# Patient Record
Sex: FEMALE | Race: WHITE | NOT HISPANIC OR LATINO | Employment: FULL TIME | URBAN - METROPOLITAN AREA
[De-identification: names, ages, dates, MRNs, and addresses within clinical notes are randomized per-mention and may not be internally consistent; named-entity substitution may affect disease eponyms.]

---

## 2023-07-22 ENCOUNTER — OFFICE VISIT (OUTPATIENT)
Age: 48
End: 2023-07-22
Payer: COMMERCIAL

## 2023-07-22 VITALS
OXYGEN SATURATION: 98 % | WEIGHT: 150 LBS | HEART RATE: 71 BPM | DIASTOLIC BLOOD PRESSURE: 70 MMHG | HEIGHT: 55 IN | TEMPERATURE: 98.9 F | SYSTOLIC BLOOD PRESSURE: 122 MMHG | RESPIRATION RATE: 18 BRPM | BODY MASS INDEX: 34.71 KG/M2

## 2023-07-22 DIAGNOSIS — N30.00 ACUTE CYSTITIS WITHOUT HEMATURIA: Primary | ICD-10-CM

## 2023-07-22 LAB
SL AMB  POCT GLUCOSE, UA: ABNORMAL
SL AMB LEUKOCYTE ESTERASE,UA: ABNORMAL
SL AMB POCT BILIRUBIN,UA: ABNORMAL
SL AMB POCT BLOOD,UA: ABNORMAL
SL AMB POCT CLARITY,UA: ABNORMAL
SL AMB POCT COLOR,UA: YELLOW
SL AMB POCT KETONES,UA: 15
SL AMB POCT NITRITE,UA: ABNORMAL
SL AMB POCT PH,UA: 6
SL AMB POCT SPECIFIC GRAVITY,UA: 1
SL AMB POCT URINE PROTEIN: ABNORMAL
SL AMB POCT UROBILINOGEN: 0.2

## 2023-07-22 PROCEDURE — 87186 SC STD MICRODIL/AGAR DIL: CPT | Performed by: EMERGENCY MEDICINE

## 2023-07-22 PROCEDURE — 81002 URINALYSIS NONAUTO W/O SCOPE: CPT | Performed by: EMERGENCY MEDICINE

## 2023-07-22 PROCEDURE — 87086 URINE CULTURE/COLONY COUNT: CPT | Performed by: EMERGENCY MEDICINE

## 2023-07-22 PROCEDURE — 87077 CULTURE AEROBIC IDENTIFY: CPT | Performed by: EMERGENCY MEDICINE

## 2023-07-22 PROCEDURE — G0382 LEV 3 HOSP TYPE B ED VISIT: HCPCS | Performed by: EMERGENCY MEDICINE

## 2023-07-22 RX ORDER — NITROFURANTOIN 25; 75 MG/1; MG/1
100 CAPSULE ORAL 2 TIMES DAILY
Qty: 14 CAPSULE | Refills: 0 | Status: SHIPPED | OUTPATIENT
Start: 2023-07-22 | End: 2023-07-29

## 2023-07-22 RX ORDER — DROSPIRENONE AND ETHINYL ESTRADIOL 0.02-3(28)
1 KIT ORAL DAILY
COMMUNITY
Start: 2022-08-08 | End: 2023-08-08

## 2023-07-22 RX ORDER — BUPROPION HYDROCHLORIDE 300 MG/1
300 TABLET ORAL DAILY
COMMUNITY
Start: 2023-07-05

## 2023-07-22 RX ORDER — PHENAZOPYRIDINE HYDROCHLORIDE 200 MG/1
200 TABLET, FILM COATED ORAL
Qty: 10 TABLET | Refills: 0 | Status: SHIPPED | OUTPATIENT
Start: 2023-07-22

## 2023-07-22 RX ORDER — PROGESTERONE 100 MG/1
1 CAPSULE ORAL
COMMUNITY
Start: 2023-05-03

## 2023-07-22 NOTE — PATIENT INSTRUCTIONS
We are sending your urine for culture to determine whether the bacteria causing the infection will be killed with the antibiotic we prescribed for you. We will call you if there is any problem, like resistance, with the antibiotic we have prescribed. You may take over the counter cranberry supplements, such as AZO tablets, which may lessen your UTI symptoms  Increase the amount of fluids you drink daily to flush infection  Follow up with Gynecologist if your symptoms do not resolve after antibiotic  Go to Emergency Room if you develop a fever greater than 101.4 and back pain  Urinary Tract Infection in Women   WHAT YOU NEED TO KNOW:   A urinary tract infection (UTI) is caused by bacteria that get inside your urinary tract. Your urinary tract includes your kidneys, ureters, bladder, and urethra. A UTI is more common in your lower urinary tract, which includes your bladder and urethra. DISCHARGE INSTRUCTIONS:   Return to the emergency department if:   You are urinating very little or not at all. You have a high fever with shaking chills. You have side or back pain that gets worse. Call your doctor if:   You have a fever. You do not feel better after 2 days of taking antibiotics. You have new symptoms, such as blood or pus in your urine. You are vomiting. You have questions or concerns about your condition or care. Medicines:   Antibiotics  treat a bacterial infection. If you have UTIs often (called recurrent UTIs), you may be given antibiotics to take regularly. You will be given directions for when and how to use antibiotics. The goal is to prevent UTIs but not cause antibiotic resistance by using antibiotics too often. Medicines  may be given to decrease pain and burning when you urinate. They will also help decrease the feeling that you need to urinate often. These medicines may make your urine orange or red. Take your medicine as directed.   Contact your healthcare provider if you think your medicine is not helping or if you have side effects. Tell your provider if you are allergic to any medicine. Keep a list of the medicines, vitamins, and herbs you take. Include the amounts, and when and why you take them. Bring the list or the pill bottles to follow-up visits. Carry your medicine list with you in case of an emergency. Follow up with your doctor as directed:  Write down your questions so you remember to ask them during your visits. Prevent another UTI:   Empty your bladder often. Urinate and empty your bladder as soon as you feel the need. Do not hold your urine for long periods of time. Wipe from front to back after you urinate or have a bowel movement. This will help prevent germs from getting into your urinary tract through your urethra. Drink liquids as directed. Ask how much liquid to drink each day and which liquids are best for you. You may need to drink more liquids than usual to help flush out the bacteria. Do not drink alcohol, caffeine, or citrus juices. These can irritate your bladder and increase your symptoms. Your healthcare provider may recommend cranberry juice to help prevent a UTI. Urinate before and after you have sex. This can help flush out bacteria passed during sex. Do not douche or use feminine deodorants. These can change the chemical balance in your vagina. Change sanitary pads or tampons often. This will help prevent germs from getting into your urinary tract. Talk to your healthcare provider about your birth control method. You may need to change your method if it is increasing your risk for UTIs. Wear cotton underwear and clothes that are loose. Tight pants and nylon underwear can trap moisture and cause bacteria to grow. Vaginal estrogen may be recommended. This medicine helps prevent UTIs in women who have gone through menopause or are in delia-menopause. Do pelvic muscle exercises often.   Pelvic muscle exercises may help you start and stop urinating. Strong pelvic muscles may help you empty your bladder easier. Squeeze these muscles tightly for 5 seconds like you are trying to hold back urine. Then relax for 5 seconds. Gradually work up to squeezing for 10 seconds. Do 3 sets of 15 repetitions a day, or as directed. © Copyright Renae Perez 2022 Information is for End User's use only and may not be sold, redistributed or otherwise used for commercial purposes. The above information is an  only. It is not intended as medical advice for individual conditions or treatments. Talk to your doctor, nurse or pharmacist before following any medical regimen to see if it is safe and effective for you.

## 2023-07-22 NOTE — PROGRESS NOTES
[facility]        NAME: Brittany Sheridan is a 53 y.o. female  : 1975    MRN: [id number]  DATE: 2023  TIME: 9:35 AM    Assessment and Plan   Acute cystitis without hematuria [N30.00]  1. Acute cystitis without hematuria  Urine culture    POCT urine dip    nitrofurantoin (MACROBID) 100 mg capsule    phenazopyridine (PYRIDIUM) 200 mg tablet            Patient Instructions     Patient Instructions     We are sending your urine for culture to determine whether the bacteria causing the infection will be killed with the antibiotic we prescribed for you. We will call you if there is any problem, like resistance, with the antibiotic we have prescribed. You may take over the counter cranberry supplements, such as AZO tablets, which may lessen your UTI symptoms  Increase the amount of fluids you drink daily to flush infection  a. Follow up with Gynecologist if your symptoms do not resolve after antibiotic  b. Go to Emergency Room if you develop a fever greater than 101.4 and back pain  Urinary Tract Infection in Women   WHAT YOU NEED TO KNOW:   A urinary tract infection (UTI) is caused by bacteria that get inside your urinary tract. Your urinary tract includes your kidneys, ureters, bladder, and urethra. A UTI is more common in your lower urinary tract, which includes your bladder and urethra. DISCHARGE INSTRUCTIONS:   Return to the emergency department if:   • You are urinating very little or not at all. • You have a high fever with shaking chills. • You have side or back pain that gets worse. Call your doctor if:   • You have a fever. • You do not feel better after 2 days of taking antibiotics. • You have new symptoms, such as blood or pus in your urine. • You are vomiting. • You have questions or concerns about your condition or care. Medicines:   • Antibiotics  treat a bacterial infection.  If you have UTIs often (called recurrent UTIs), you may be given antibiotics to take regularly. You will be given directions for when and how to use antibiotics. The goal is to prevent UTIs but not cause antibiotic resistance by using antibiotics too often. • Medicines  may be given to decrease pain and burning when you urinate. They will also help decrease the feeling that you need to urinate often. These medicines may make your urine orange or red. • Take your medicine as directed. Contact your healthcare provider if you think your medicine is not helping or if you have side effects. Tell your provider if you are allergic to any medicine. Keep a list of the medicines, vitamins, and herbs you take. Include the amounts, and when and why you take them. Bring the list or the pill bottles to follow-up visits. Carry your medicine list with you in case of an emergency. Follow up with your doctor as directed:  Write down your questions so you remember to ask them during your visits. Prevent another UTI:   • Empty your bladder often. Urinate and empty your bladder as soon as you feel the need. Do not hold your urine for long periods of time. • Wipe from front to back after you urinate or have a bowel movement. This will help prevent germs from getting into your urinary tract through your urethra. • Drink liquids as directed. Ask how much liquid to drink each day and which liquids are best for you. You may need to drink more liquids than usual to help flush out the bacteria. Do not drink alcohol, caffeine, or citrus juices. These can irritate your bladder and increase your symptoms. Your healthcare provider may recommend cranberry juice to help prevent a UTI. • Urinate before and after you have sex. This can help flush out bacteria passed during sex. • Do not douche or use feminine deodorants. These can change the chemical balance in your vagina. • Change sanitary pads or tampons often.   This will help prevent germs from getting into your urinary tract.    • Talk to your healthcare provider about your birth control method. You may need to change your method if it is increasing your risk for UTIs. • Wear cotton underwear and clothes that are loose. Tight pants and nylon underwear can trap moisture and cause bacteria to grow. • Vaginal estrogen may be recommended. This medicine helps prevent UTIs in women who have gone through menopause or are in delia-menopause. • Do pelvic muscle exercises often. Pelvic muscle exercises may help you start and stop urinating. Strong pelvic muscles may help you empty your bladder easier. Squeeze these muscles tightly for 5 seconds like you are trying to hold back urine. Then relax for 5 seconds. Gradually work up to squeezing for 10 seconds. Do 3 sets of 15 repetitions a day, or as directed. © Copyright Lorena Forde 2022 Information is for End User's use only and may not be sold, redistributed or otherwise used for commercial purposes. The above information is an  only. It is not intended as medical advice for individual conditions or treatments. Talk to your doctor, nurse or pharmacist before following any medical regimen to see if it is safe and effective for you. Follow up with PCP in 3-5 days. Proceed to  ER if symptoms worsen. Chief Complaint     Chief Complaint   Patient presents with   • Possible UTI     Burning with urination and urine freq that started this morning          History of Present Illness       Patient complains of burning on urination increased frequency since this morning. Review of Systems   Review of Systems   Constitutional: Negative for chills and fever. Genitourinary: Positive for dysuria and frequency. Negative for difficulty urinating, flank pain, hematuria and urgency. Musculoskeletal: Negative for back pain.          Current Medications       Current Outpatient Medications:   •  buPROPion (WELLBUTRIN XL) 300 mg 24 hr tablet, Take 300 mg by mouth daily, Disp: , Rfl:   •  drospirenone-ethinyl estradiol (STEVEN) 3-0.02 MG per tablet, Take 1 tablet by mouth daily, Disp: , Rfl:   •  nitrofurantoin (MACROBID) 100 mg capsule, Take 1 capsule (100 mg total) by mouth 2 (two) times a day for 7 days, Disp: 14 capsule, Rfl: 0  •  phenazopyridine (PYRIDIUM) 200 mg tablet, Take 1 tablet (200 mg total) by mouth 3 (three) times a day with meals, Disp: 10 tablet, Rfl: 0  •  Progesterone 100 MG CAPS, Take 1 capsule by mouth daily at bedtime, Disp: , Rfl:     Current Allergies     Allergies as of 07/22/2023 - Reviewed 07/22/2023   Allergen Reaction Noted   • Lamotrigine Dermatitis and Rash 07/22/2017            The following portions of the patient's history were reviewed and updated as appropriate: allergies, current medications, past family history, past medical history, past social history, past surgical history and problem list.     History reviewed. No pertinent past medical history. History reviewed. No pertinent surgical history. Family History   Problem Relation Age of Onset   • No Known Problems Mother    • No Known Problems Father          Medications have been verified. Objective   /70   Pulse 71   Temp 98.9 °F (37.2 °C) (Tympanic)   Resp 18   Ht 4' (1.219 m)   Wt 68 kg (150 lb)   SpO2 98%   BMI 45.77 kg/m²        Physical Exam     Physical Exam  Vitals and nursing note reviewed. Constitutional:       General: She is not in acute distress. Appearance: She is well-developed. Abdominal:      General: Bowel sounds are normal. There is no distension. Palpations: Abdomen is soft. There is no mass. Tenderness: There is abdominal tenderness. There is no right CVA tenderness, left CVA tenderness, guarding or rebound. Comments: There is suprapubic region, no rebound or guarding. Skin:     General: Skin is warm and dry. Findings: No rash.    Neurological:      Mental Status: She is alert and oriented to person, place, and time.   Psychiatric:         Behavior: Behavior normal.         Thought Content:  Thought content normal.         Judgment: Judgment normal.

## 2023-07-23 LAB — BACTERIA UR CULT: ABNORMAL

## 2023-07-24 LAB — BACTERIA UR CULT: ABNORMAL

## 2025-03-21 ENCOUNTER — OFFICE VISIT (OUTPATIENT)
Dept: URGENT CARE | Facility: CLINIC | Age: 50
End: 2025-03-21
Payer: COMMERCIAL

## 2025-03-21 VITALS
HEART RATE: 65 BPM | RESPIRATION RATE: 18 BRPM | SYSTOLIC BLOOD PRESSURE: 142 MMHG | OXYGEN SATURATION: 96 % | DIASTOLIC BLOOD PRESSURE: 82 MMHG | HEIGHT: 59 IN | BODY MASS INDEX: 31.57 KG/M2 | TEMPERATURE: 96.7 F | WEIGHT: 156.6 LBS

## 2025-03-21 DIAGNOSIS — K13.0 ANGULAR CHEILITIS: Primary | ICD-10-CM

## 2025-03-21 PROCEDURE — G0382 LEV 3 HOSP TYPE B ED VISIT: HCPCS

## 2025-03-21 RX ORDER — HYDROXYZINE PAMOATE 25 MG/1
CAPSULE ORAL
COMMUNITY
Start: 2025-03-10

## 2025-03-21 RX ORDER — TRAZODONE HYDROCHLORIDE 50 MG/1
TABLET ORAL
COMMUNITY
Start: 2025-03-06

## 2025-03-21 RX ORDER — MICONAZOLE NITRATE 20 MG/G
CREAM TOPICAL
Qty: 28 G | Refills: 0 | Status: SHIPPED | OUTPATIENT
Start: 2025-03-21

## 2025-03-21 RX ORDER — GABAPENTIN 300 MG/1
CAPSULE ORAL
COMMUNITY
Start: 2025-03-10

## 2025-03-21 NOTE — PATIENT INSTRUCTIONS
Topical miconazole cream as prescribed. Do not use inside the mouth.    Tylenol/ibuprofen for pain/fever.  Increased fluids & rest.  Moisturize lips often.  Monitor for signs of infection.     Follow up with PCP in 3-5 days.  Proceed to  ER if symptoms worsen.    If tests have been performed at [facility], our office will contact you with results if changes need to be made to the care plan discussed with you at the visit.  You can review your full results on [facility] MyChart.

## 2025-03-21 NOTE — PROGRESS NOTES
[facility]        NAME: Renée Villanueva is a 50 y.o. female  : 1975    MRN: [id number]  DATE: 2025  TIME: 10:20 AM    Assessment and Plan   Angular cheilitis [K13.0]  1. Angular cheilitis  miconazole 2 % cream        Plan to treat with topical miconazole cream to affected areas, as patient reports that she has undergone this treatment in the past with this medication and had good results.  Advised patient to not use this medication inside the mouth, for external use only.  Monitor for worsening signs of infection.  Continue using lip/mouth moisturizers.  Tylenol/ibuprofen for pain/fever. Increased fluids & rest. May continue with other OTC and supportive therapies at home. Patient in agreement with plan & verbalized understanding.       Patient Instructions   Topical miconazole cream as prescribed. Do not use inside the mouth.    Tylenol/ibuprofen for pain/fever.  Increased fluids & rest.  Moisturize lips often.  Monitor for signs of infection.     Follow up with PCP in 3-5 days.  Proceed to  ER if symptoms worsen.    If tests have been performed at [facility], our office will contact you with results if changes need to be made to the care plan discussed with you at the visit.  [facility]    Chief Complaint     Chief Complaint   Patient presents with    lip pain     Lip pain started a week ago. Having cranking in the corners of her lips          History of Present Illness       Patient is a 50-year-old female who presents today for evaluation of lip pain.  She reports that approximately 1 week ago she started noticing that the corners of her lips were severely cracked and painful.  She reports that she does have a history of this, stating that when her lips had cracked once in the past she required antifungal treatment as she developed a yeast buildup within the cracks.  She reports that she keeps her lips continuously moisturized with  Chapstick and moisturizer, however every time that she feels as though the outer corners of her lips are beginning to heal they began to crack open once again.        Review of Systems   Review of Systems   Constitutional:  Negative for activity change, appetite change, chills, diaphoresis, fatigue and fever.   HENT:  Negative for congestion, ear pain, facial swelling, mouth sores, rhinorrhea, sore throat and trouble swallowing.    Respiratory:  Negative for cough, chest tightness and shortness of breath.    Cardiovascular:  Negative for chest pain and palpitations.   Gastrointestinal:  Negative for abdominal pain, diarrhea, nausea and vomiting.   Musculoskeletal:  Negative for gait problem and myalgias.   Skin:  Positive for color change and wound (Cracks to bilateral corners of mouth.). Negative for pallor and rash.   Neurological:  Negative for dizziness, weakness, light-headedness and headaches.   All other systems reviewed and are negative.        Current Medications       Current Outpatient Medications:     buPROPion (WELLBUTRIN XL) 300 mg 24 hr tablet, Take 300 mg by mouth daily, Disp: , Rfl:     gabapentin (NEURONTIN) 300 mg capsule, TAKE 1 CAPSULE BY MOUTH EVERY NIGHT AS NEEDED FOR ANXIETY AND SLEEP, Disp: , Rfl:     hydrOXYzine pamoate (VISTARIL) 25 mg capsule, take 1 capsule by mouth once daily if needed FOR SLEEP AND ANXIETY, Disp: , Rfl:     miconazole 2 % cream, Apply to the affected area twice daily for 7-14 days (until symptoms resolve)., Disp: 28 g, Rfl: 0    traZODone (DESYREL) 50 mg tablet, take 1 tablet by mouth at bedtime for 30 DAYS if needed for insomnia, Disp: , Rfl:     drospirenone-ethinyl estradiol (STEVEN) 3-0.02 MG per tablet, Take 1 tablet by mouth daily, Disp: , Rfl:     phenazopyridine (PYRIDIUM) 200 mg tablet, Take 1 tablet (200 mg total) by mouth 3 (three) times a day with meals (Patient not taking: Reported on 3/21/2025), Disp: 10 tablet, Rfl: 0    Progesterone 100 MG CAPS, Take 1  "capsule by mouth daily at bedtime (Patient not taking: Reported on 3/21/2025), Disp: , Rfl:     Current Allergies     Allergies as of 03/21/2025 - Reviewed 03/21/2025   Allergen Reaction Noted    Lamotrigine Dermatitis and Rash 07/22/2017            The following portions of the patient's history were reviewed and updated as appropriate: allergies, current medications, past family history, past medical history, past social history, past surgical history and problem list.     History reviewed. No pertinent past medical history.    Past Surgical History:   Procedure Laterality Date    HYSTERECTOMY         Family History   Problem Relation Age of Onset    No Known Problems Mother     No Known Problems Father          Medications have been verified.        Objective   /82   Pulse 65   Temp (!) 96.7 °F (35.9 °C)   Resp 18   Ht 4' 11\" (1.499 m)   Wt 71 kg (156 lb 9.6 oz)   SpO2 96%   BMI 31.63 kg/m²   No LMP recorded. Patient has had a hysterectomy.       Physical Exam     Physical Exam  Vitals and nursing note reviewed.   Constitutional:       General: She is not in acute distress.     Appearance: Normal appearance. She is not ill-appearing, toxic-appearing or diaphoretic.   HENT:      Head: Normocephalic and atraumatic.      Right Ear: External ear normal.      Left Ear: External ear normal.      Nose: Nose normal.      Mouth/Throat:      Lips: Pink. Lesions present.      Mouth: Mucous membranes are moist. No injury or oral lesions.      Pharynx: Oropharynx is clear.        Comments: Cracks present to bilateral corners of mouth consistent with angular cheilitis.  There is erythema surrounding the affected areas and scant amounts of white discharge noted from both areas.  The cracks do extend from the outer corners of the mouth inward into the mouth.  No active bleeding noted from affected areas.  Eyes:      Extraocular Movements: Extraocular movements intact.      Conjunctiva/sclera: Conjunctivae normal.    "   Pupils: Pupils are equal, round, and reactive to light.   Cardiovascular:      Rate and Rhythm: Normal rate and regular rhythm.      Pulses: Normal pulses.      Heart sounds: Normal heart sounds.   Pulmonary:      Effort: Pulmonary effort is normal. No respiratory distress.      Breath sounds: Normal breath sounds. No stridor. No wheezing, rhonchi or rales.   Chest:      Chest wall: No tenderness.   Musculoskeletal:         General: Normal range of motion.      Cervical back: Normal range of motion and neck supple.   Skin:     General: Skin is warm and dry.      Capillary Refill: Capillary refill takes less than 2 seconds.      Coloration: Skin is not pale.      Findings: No erythema or rash.      Comments: Cracks/fissure to bilateral corners of mouth.   Neurological:      General: No focal deficit present.      Mental Status: She is alert. Mental status is at baseline.   Psychiatric:         Mood and Affect: Mood normal.         Behavior: Behavior normal. Behavior is cooperative.         Thought Content: Thought content normal.         Judgment: Judgment normal.